# Patient Record
Sex: MALE | Race: WHITE | NOT HISPANIC OR LATINO | Employment: FULL TIME | URBAN - METROPOLITAN AREA
[De-identification: names, ages, dates, MRNs, and addresses within clinical notes are randomized per-mention and may not be internally consistent; named-entity substitution may affect disease eponyms.]

---

## 2018-09-24 ENCOUNTER — OFFICE VISIT (OUTPATIENT)
Dept: FAMILY MEDICINE | Facility: CLINIC | Age: 18
End: 2018-09-24

## 2018-09-24 VITALS
DIASTOLIC BLOOD PRESSURE: 76 MMHG | WEIGHT: 180 LBS | BODY MASS INDEX: 25.77 KG/M2 | OXYGEN SATURATION: 100 % | HEIGHT: 70 IN | SYSTOLIC BLOOD PRESSURE: 128 MMHG | RESPIRATION RATE: 12 BRPM | TEMPERATURE: 97.6 F | HEART RATE: 55 BPM

## 2018-09-24 DIAGNOSIS — Z00.00 HEALTH CARE MAINTENANCE: Primary | ICD-10-CM

## 2018-09-24 DIAGNOSIS — R30.0 DYSURIA: ICD-10-CM

## 2018-09-24 LAB
ALBUMIN UR-MCNC: NEGATIVE MG/DL
APPEARANCE UR: CLEAR
BACTERIA #/AREA URNS HPF: ABNORMAL /HPF
BILIRUB UR QL STRIP: NEGATIVE
COLOR UR AUTO: YELLOW
GLUCOSE UR STRIP-MCNC: NEGATIVE MG/DL
HGB BLD-MCNC: 15.4 G/DL (ref 13.3–17.7)
HGB UR QL STRIP: NEGATIVE
KETONES UR STRIP-MCNC: NEGATIVE MG/DL
LEUKOCYTE ESTERASE UR QL STRIP: NEGATIVE
MUCOUS THREADS #/AREA URNS LPF: PRESENT /LPF
NITRATE UR QL: NEGATIVE
PH UR STRIP: 7 PH (ref 5–7)
RBC #/AREA URNS AUTO: ABNORMAL /HPF
SOURCE: ABNORMAL
SP GR UR STRIP: 1.02 (ref 1–1.03)
UROBILINOGEN UR STRIP-ACNC: 0.2 EU/DL (ref 0.2–1)
WBC #/AREA URNS AUTO: ABNORMAL /HPF

## 2018-09-24 PROCEDURE — 82947 ASSAY GLUCOSE BLOOD QUANT: CPT | Performed by: FAMILY MEDICINE

## 2018-09-24 PROCEDURE — 85018 HEMOGLOBIN: CPT | Performed by: FAMILY MEDICINE

## 2018-09-24 PROCEDURE — 83721 ASSAY OF BLOOD LIPOPROTEIN: CPT | Performed by: FAMILY MEDICINE

## 2018-09-24 PROCEDURE — 99395 PREV VISIT EST AGE 18-39: CPT | Performed by: FAMILY MEDICINE

## 2018-09-24 PROCEDURE — 81001 URINALYSIS AUTO W/SCOPE: CPT | Performed by: FAMILY MEDICINE

## 2018-09-24 PROCEDURE — 36415 COLL VENOUS BLD VENIPUNCTURE: CPT | Performed by: FAMILY MEDICINE

## 2018-09-24 NOTE — MR AVS SNAPSHOT
After Visit Summary   9/24/2018    Michael Morrison    MRN: [id number]           Patient Information     Date Of Birth          2000        Visit Information        Provider Department      9/24/2018 10:20 AM Ken Akhtar MD [facility] [place]        Today's Diagnoses     Health care maintenance    -  1    Dysuria          Care Instructions      Preventive Health Recommendations  Male Ages 18 - 20     Yearly exam:             See your health care provider every year in order to  o   Review health changes.   o   Discuss preventive care.    o   Review your medicines if your doctor has prescribed any.    You should be tested each year for STDs (sexually transmitted diseases).     Talk to your provider about cholesterol testing.      If you are at risk for diabetes, you should have a diabetes test (fasting glucose).    Shots: Get a flu shot each year. Get a tetanus shot every 10 years.     Nutrition:    Eat at least 5 servings of fruits and vegetables daily.     Eat whole-grain bread, whole-wheat pasta and brown rice instead of white grains and rice.     Get adequate calcium and Vitamin D.     Lifestyle    Exercise for at least 150 minutes a week (30 minutes a day, 5 days a week). This will help you control your weight and prevent disease.     No smoking.     Wear sunscreen to prevent skin cancer.     See your dentist every six months for an exam and cleaning.             Follow-ups after your visit        Follow-up notes from your care team     Return in about 1 year (around 9/24/2019) for Physical Exam.      Who to contact     If you have questions or need follow up information about today's clinic visit or your schedule please contact [facility] directly at [phone].  Normal or non-critical lab and imaging results will be communicated to you by MyChart, letter or phone within 4 business days after the clinic has received the results. If you do not hear from us  "within 7 days, please contact the clinic through [facility] or phone. If you have a critical or abnormal lab result, we will notify you by phone as soon as possible.  Submit refill requests through [employer] or call your pharmacy and they will forward the refill request to us. Please allow 3 business days for your refill to be completed.          Additional Information About Your Visit        [employer]Cloud[employer]ax Information     [employer] [employer] lets you send messages to your doctor, view your test results, renew your prescriptions, schedule appointments and more. To sign up, go to [email or web address] . Click on \"Log in\" on the left side of the screen, which will take you to the Welcome page. Then click on \"Sign up Now\" on the right side of the page.     You will be asked to enter the access code listed below, as well as some personal information. Please follow the directions to create your username and password.     Your access code is: [email or web address]  Expires: 2018 10:56 AM     Your access code will  in 90 days. If you need help or a new code, please call your [facility] or [phone].        Care EveryWhere ID     This is your Care EveryWhere ID. This could be used by other organizations to access your [facility] medical records  [id number]        Your Vitals Were     Pulse Temperature Respirations Height Pulse Oximetry BMI (Body Mass Index)    55 97.6  F (36.4  C) (Tympanic) 12 5' 10\" (1.778 m) 100% 25.83 kg/m2       Blood Pressure from Last 3 Encounters:   18 128/76    Weight from Last 3 Encounters:   18 180 lb (81.6 kg) (80 %)*     * Growth percentiles are based on CDC 2-20 Years data.              We Performed the Following     Glucose     Hemoglobin     LDL cholesterol direct     UA with Microscopic reflex to Culture        Primary Care Provider    Provider Not In System                Equal Access to Services     JOLYNN HINKLE : zaire Jane, bradly chris, " javi soliz nina medina'aan ah. So [facility] [phone].    ATENCIÓN: Si habla español, tiene a cox disposición servicios gratuitos de asistencia lingüística. Llmarlene al [phone].    We comply with applicable federal civil rights laws and [place] laws. We do not discriminate on the basis of race, color, national origin, age, disability, sex, sexual orientation, or gender identity.            Thank you!     [facility]. Our goal is always to provide you with excellent care. Hearing back from our patients is one way we can continue to improve our services. Please take a few minutes to complete the written survey that you may receive in the mail after your visit with us. Thank you!             Your Updated Medication List - Protect others around you: Learn how to safely use, store and throw away your medicines at www.disposemymeds.org.      Notice  As of 9/24/2018 10:56 AM    You have not been prescribed any medications.

## 2018-09-24 NOTE — PROGRESS NOTES
SUBJECTIVE:   CC: Michael Morrison is an 17 year old male who presents for preventative health visit.     Healthy Habits:    Do you get at least three servings of calcium containing foods daily (dairy, green leafy vegetables, etc.)? yes    Amount of exercise or daily activities, outside of work: 5-6 day(s) per week    Problems taking medications regularly No    Medication side effects: No    Have you had an eye exam in the past two years? no    Do you see a dentist twice per year? no    Do you have sleep apnea, excessive snoring or daytime drowsiness?no       Genitourinary symptoms      Duration: 1.5 months     Description:  dysuria and nocturia x 2-3      Today's PHQ-2 Score:   PHQ-2 ( 1999 [employer]) 9/24/2018   Q1: Little interest or pleasure in doing things 0   Q2: Feeling down, depressed or hopeless 0   PHQ-2 Score 0       Abuse: Current or Past(Physical, Sexual or Emotional)- No  Do you feel safe in your environment - Yes    Social History   Substance Use Topics     Smoking status: Not on file     Smokeless tobacco: Not on file     Alcohol use Not on file      If you drink alcohol do you typically have >3 drinks per day or >7 drinks per week? No                      Last PSA: No results found for: PSA    Reviewed orders with patient. Reviewed health maintenance and updated orders accordingly - Yes  BP Readings from Last 3 Encounters:   09/24/18 128/76    Wt Readings from Last 3 Encounters:   09/24/18 180 lb (81.6 kg) (80 %)*     * Growth percentiles are based on CDC 2-20 Years data.                  There is no problem list on file for this patient.    No past surgical history on file.    Social History   Substance Use Topics     Smoking status: Never Smoker     Smokeless tobacco: Never Used     Alcohol use No     No family history on file.      No current outpatient prescriptions on file.     Allergies   Allergen Reactions     Cats Other (See Comments)     Stuffy nose     Nuts GI Disturbance     No lab  "results found.     Reviewed and updated as needed this visit by clinical staff  Meds         Reviewed and updated as needed this visit by Provider        No past medical history on file.   No past surgical history on file.    ROS:  CONSTITUTIONAL: NEGATIVE for fever, chills, change in weight  INTEGUMENTARY/SKIN: NEGATIVE for worrisome rashes, moles or lesions  EYES: NEGATIVE for vision changes or irritation  ENT: NEGATIVE for ear, mouth and throat problems  RESP: NEGATIVE for significant cough or SOB  CV: NEGATIVE for chest pain, palpitations or peripheral edema  GI: NEGATIVE for nausea, abdominal pain, heartburn, or change in bowel habits   male: negative for dysuria, hematuria, decreased urinary stream, erectile dysfunction, urethral discharge  MUSCULOSKELETAL: NEGATIVE for significant arthralgias or myalgia  NEURO: NEGATIVE for weakness, dizziness or paresthesias  PSYCHIATRIC: NEGATIVE for changes in mood or affect    OBJECTIVE:   /76 (Cuff Size: Adult Regular)  Pulse 55  Temp 97.6  F (36.4  C) (Tympanic)  Resp 12  Ht 5' 10\" (1.778 m)  Wt 180 lb (81.6 kg)  SpO2 100%  BMI 25.83 kg/m2  EXAM:  GENERAL: healthy, alert and no distress  EYES: Eyes grossly normal to inspection, PERRL and conjunctivae and sclerae normal  HENT: ear canals and TM's normal, nose and mouth without ulcers or lesions  NECK: no adenopathy, no asymmetry, masses, or scars and thyroid normal to palpation  RESP: lungs clear to auscultation - no rales, rhonchi or wheezes  CV: regular rate and rhythm, normal S1 S2, no S3 or S4, no murmur, click or rub, no peripheral edema and peripheral pulses strong  ABDOMEN: soft, nontender, no hepatosplenomegaly, no masses and bowel sounds normal   (male): normal male genitalia without lesions or urethral discharge, no hernia  MS: no gross musculoskeletal defects noted, no edema  SKIN: no suspicious lesions or rashes  NEURO: Normal strength and tone, mentation intact and speech normal  BACK: no " "CVA tenderness, no paralumbar tenderness  PSYCH: mentation appears normal, affect normal/bright  LYMPH: no cervical, supraclavicular, axillary, or inguinal adenopathy        ASSESSMENT/PLAN:       ICD-10-CM    1. Health care maintenance Z00.00 Hemoglobin     Glucose     LDL cholesterol direct   2. Dysuria R30.0 UA with Microscopic reflex to Culture       COUNSELING:  Reviewed preventive health counseling, as reflected in patient instructions    BP Readings from Last 1 Encounters:   09/24/18 128/76     Estimated body mass index is 25.83 kg/(m^2) as calculated from the following:    Height as of this encounter: 5' 10\" (1.778 m).    Weight as of this encounter: 180 lb (81.6 kg).           reports that he has never smoked. He has never used smokeless tobacco.      Counseling Resources:  ATP IV Guidelines  Pooled Cohorts Equation Calculator  FRAX Risk Assessment  ICSI Preventive Guidelines  Dietary Guidelines for Americans, 2010  USDA's MyPlate  ASA Prophylaxis  Lung CA Screening    Ken Akhtar MD  [facility] [place]  "

## 2018-09-24 NOTE — LETTER
September 25, 2018      Michael Morrison  [address] [facility] [address]        Dear ,    We are writing to inform you of your test results.    You maybe able to check the lab results via [employer], it showed your lab results including LDL cholesterol/glucose to rule out diabetes/hemoglobin to rule out anemia were all normal.  Please stay on top of your current health status and recheck fasting lab at your next physical exam.    Resulted Orders   Hemoglobin   Result Value Ref Range    Hemoglobin 15.4 13.3 - 17.7 g/dL   Glucose   Result Value Ref Range    Glucose 92 70 - 99 mg/dL      Comment:      Fasting specimen   LDL cholesterol direct   Result Value Ref Range    LDL Cholesterol Direct 100 <110 mg/dL   UA with Microscopic reflex to Culture   Result Value Ref Range    Color Urine Yellow     Appearance Urine Clear     Glucose Urine Negative NEG^Negative mg/dL    Bilirubin Urine Negative NEG^Negative    Ketones Urine Negative NEG^Negative mg/dL    Specific Gravity Urine 1.020 1.003 - 1.035    pH Urine 7.0 5.0 - 7.0 pH    Protein Albumin Urine Negative NEG^Negative mg/dL    Urobilinogen Urine 0.2 0.2 - 1.0 EU/dL    Nitrite Urine Negative NEG^Negative    Blood Urine Negative NEG^Negative    Leukocyte Esterase Urine Negative NEG^Negative    Source Urine     WBC Urine 0 - 5 OTO5^0 - 5 /HPF    RBC Urine O - 2 OTO2^O - 2 /HPF    Bacteria Urine Few (A) NEG^Negative /HPF    Mucous Urine Present (A) NEG^Negative /LPF       If you have any questions or concerns, please call the clinic at the number listed above.       Sincerely,        Ken Akhtar MD

## 2018-09-25 LAB
GLUCOSE SERPL-MCNC: 92 MG/DL (ref 70–99)
LDLC SERPL DIRECT ASSAY-MCNC: 100 MG/DL

## 2022-06-07 NOTE — PATIENT INSTRUCTIONS
Preventive Health Recommendations  Male Ages 18 - 20     Yearly exam:             See your health care provider every year in order to  o   Review health changes.   o   Discuss preventive care.    o   Review your medicines if your doctor has prescribed any.    You should be tested each year for STDs (sexually transmitted diseases).     Talk to your provider about cholesterol testing.      If you are at risk for diabetes, you should have a diabetes test (fasting glucose).    Shots: Get a flu shot each year. Get a tetanus shot every 10 years.     Nutrition:    Eat at least 5 servings of fruits and vegetables daily.     Eat whole-grain bread, whole-wheat pasta and brown rice instead of white grains and rice.     Get adequate calcium and Vitamin D.     Lifestyle    Exercise for at least 150 minutes a week (30 minutes a day, 5 days a week). This will help you control your weight and prevent disease.     No smoking.     Wear sunscreen to prevent skin cancer.     See your dentist every six months for an exam and cleaning.     
independent

## 2024-07-07 ENCOUNTER — OFFICE VISIT (OUTPATIENT)
Dept: URGENT CARE | Facility: URGENT CARE | Age: 24
End: 2024-07-07
Payer: COMMERCIAL

## 2024-07-07 VITALS
BODY MASS INDEX: 23.62 KG/M2 | WEIGHT: 164.6 LBS | DIASTOLIC BLOOD PRESSURE: 87 MMHG | SYSTOLIC BLOOD PRESSURE: 122 MMHG | OXYGEN SATURATION: 98 % | RESPIRATION RATE: 20 BRPM | TEMPERATURE: 98.4 F | HEART RATE: 85 BPM

## 2024-07-07 DIAGNOSIS — F41.9 ANXIETY: ICD-10-CM

## 2024-07-07 DIAGNOSIS — R42 DIZZINESS: ICD-10-CM

## 2024-07-07 DIAGNOSIS — F41.0 PANIC ATTACK: Primary | ICD-10-CM

## 2024-07-07 DIAGNOSIS — R20.2 PARESTHESIA: ICD-10-CM

## 2024-07-07 DIAGNOSIS — R00.2 PALPITATIONS: ICD-10-CM

## 2024-07-07 PROCEDURE — 99202 OFFICE O/P NEW SF 15 MIN: CPT

## 2024-07-07 NOTE — PROGRESS NOTES
Assessment & Plan     Panic attack  Palpitations  Dizziness  Paresthesia  Brief episode of palpitations, dizziness, paresthesia's last night in setting of THC use, some sleep deprivation, hyper-focus on video game, and decreased PO intake which worsened when reading about symptoms online and resolved with rest and food. He endorses anxiety recently especially in setting of THC use but no prior history of panic attacks. Vitals and exam today are unremarkable. No significant personal or family medical history. I think this was most likely an anxiety attack which he finds a likely cause as well. Discussed if recurrent symptoms he should be re-evaluated. Discussed recommendation to establish with primary care provider to discuss anxiety management. Discussed cessation from THC as likely contributing factor.      Return in about 2 weeks (around 7/21/2024), or if symptoms worsen or fail to improve, for Follow up to establish care with a primary care provider to discuss anxiety..    David Rick MD  [facility] is a 23 year old male who presents to clinic today for the following health issues:  Chief Complaint   Patient presents with    Dizziness     Episode of dizziness and lighthead with tingling in hands and feet last night.  Some heart racing which resolved quickly  .  Feels a bit better today but still has some tingling in hands at times.         HPI    Last night he was playing video games when he noticed his heart beating faster then normal.  He got a little lightheaded when this was happening and hands were tingling  Put down video games and laid down  He got very nervous and he explained symptoms to his mother who suggested it might be an anxiety attack  This resonated for him as he was hyper fixating on video games  Was up a little bit later  Overall a little bit sleep deprived as a new video game came out which he took time off work to play - up until 3  AM  Maybe ate a bit less than normal yesterday. Today hasn't eaten much  Felt better after eating and lying down  He later took a melatonin and went to sleep  He does work out so thought he might have had some soreness in arms  Feels better today  Never had before    He has had some anxiety in the past but never had a panic attack  No significant medical conditions  No prior surgery besides wisdom teeth removal    Smokes THC. No other substance use. Did smoke last night. Sometimes he has episodes of anxiety/paranoia.     No family history of heart problems  Does have family history positive for anxiety              Objective    /87   Pulse 85   Temp 98.4  F (36.9  C) (Tympanic)   Resp 20   Wt 74.7 kg (164 lb 9.6 oz)   SpO2 98%   BMI 23.62 kg/m    Physical Exam  Constitutional:       Appearance: Normal appearance. He is not toxic-appearing or diaphoretic.   HENT:      Head: Normocephalic and atraumatic.      Mouth/Throat:      Mouth: Mucous membranes are moist.      Pharynx: Oropharynx is clear.   Eyes:      Extraocular Movements: Extraocular movements intact.      Conjunctiva/sclera: Conjunctivae normal.      Pupils: Pupils are equal, round, and reactive to light.   Cardiovascular:      Rate and Rhythm: Normal rate and regular rhythm.      Heart sounds: Normal heart sounds.   Pulmonary:      Effort: Pulmonary effort is normal.      Breath sounds: Normal breath sounds.   Musculoskeletal:      Right lower leg: No edema.      Left lower leg: No edema.   Skin:     General: Skin is warm and dry.   Neurological:      General: No focal deficit present.      Mental Status: He is alert and oriented to person, place, and time.      Cranial Nerves: No cranial nerve deficit.      Sensory: No sensory deficit.      Motor: No weakness.      Gait: Gait normal.      Deep Tendon Reflexes: Reflexes normal.

## 2024-07-07 NOTE — PATIENT INSTRUCTIONS
Try to avoid THC product to see if this helps.  Follow up with a primary doctor to discuss anxiety symptoms.  Return to care if you are having chest discomfort that is recurrent, different, or not going away.

## 2024-09-15 ENCOUNTER — OFFICE VISIT (OUTPATIENT)
Dept: URGENT CARE | Facility: URGENT CARE | Age: 24
End: 2024-09-15
Payer: COMMERCIAL

## 2024-09-15 VITALS
BODY MASS INDEX: 24.68 KG/M2 | DIASTOLIC BLOOD PRESSURE: 70 MMHG | HEART RATE: 85 BPM | WEIGHT: 172 LBS | OXYGEN SATURATION: 97 % | TEMPERATURE: 98 F | SYSTOLIC BLOOD PRESSURE: 132 MMHG | RESPIRATION RATE: 16 BRPM

## 2024-09-15 DIAGNOSIS — H60.392 INFECTIVE OTITIS EXTERNA, LEFT: ICD-10-CM

## 2024-09-15 DIAGNOSIS — H61.22 IMPACTED CERUMEN OF LEFT EAR: Primary | ICD-10-CM

## 2024-09-15 PROCEDURE — 69209 REMOVE IMPACTED EAR WAX UNI: CPT | Mod: LT | Performed by: FAMILY MEDICINE

## 2024-09-15 PROCEDURE — 99213 OFFICE O/P EST LOW 20 MIN: CPT | Mod: 25 | Performed by: FAMILY MEDICINE

## 2024-09-15 RX ORDER — NEOMYCIN SULFATE, POLYMYXIN B SULFATE AND HYDROCORTISONE 10; 3.5; 1 MG/ML; MG/ML; [USP'U]/ML
3 SUSPENSION/ DROPS AURICULAR (OTIC) 4 TIMES DAILY
Qty: 10 ML | Refills: 0 | Status: SHIPPED | OUTPATIENT
Start: 2024-09-15 | End: 2024-09-25

## 2024-09-15 NOTE — PROGRESS NOTES
SUBJECTIVE:Michael Morrison is a 23 year old male who presents with left ear waxfor day(s).     No past medical history on file.  Allergies   Allergen Reactions    Cats Other (See Comments)     Stuffy nose    Nuts GI Disturbance     Social History     Tobacco Use    Smoking status: Never    Smokeless tobacco: Never   Substance Use Topics    Alcohol use: No       ROS: CONSTITUTIONAL:NEGATIVE for fever, chills, change in weight    OBJECTIVE:  /70 (BP Location: Left arm, Patient Position: Sitting, Cuff Size: Adult Regular)   Pulse 85   Temp 98  F (36.7  C) (Tympanic)   Resp 16   Wt 78 kg (172 lb)   SpO2 97%   BMI 24.68 kg/m     The right TM is normal: no effusions, no erythema, and normal landmarks     The right auditory canal is normal and without drainage, edema or erythema  The left TM is not visualized secondary to cerumen  The left auditory canal is obstructed with cerumen    Left ear red and TM clear after ear wash      ICD-10-CM    1. Impacted cerumen of left ear  H61.22 LA REMOVAL IMPACTED CERUMEN IRRIGATION/LVG UNILAT          F/U PCP/IM/FP/UC if worse, not any better